# Patient Record
(demographics unavailable — no encounter records)

---

## 2025-02-20 NOTE — PROCEDURE
[FreeTextEntry1] : PFT demonstrates obstructive pattern with interval decline compared to prior-Outside study

## 2025-02-20 NOTE — HISTORY OF PRESENT ILLNESS
[Current] : current [Never] : never [TextBox_4] : 80 yo female with history of COPD with chronic bronchitis and emphysema presents for initial appointment. 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

## 2025-02-20 NOTE — ASSESSMENT
[FreeTextEntry1] : Pt currently takes 200 mg of Trelegy but is concerned about the cost, we will see what medication would be more affordable. Pt was advised that Gabapentin Should not significantly impair breathing, and that she should take the appropriate dosage for her vulvodynia If she continues to have pain I strongly recommend smoking cessation. Prescribed Anoro, advised pt to communicate with the nurses if the medication is cost prohibitive. Spirometry performed in office today shows X-ray findings determine further evaluation, I strongly recommend a lung CT scan. For now patient is refusing will consider on follow-up

## 2025-02-20 NOTE — ADDENDUM
[FreeTextEntry1] : Recorded by Estephania Coy acting as a scribe for Dr. Curtis Echavarria M.D, on 02/20/2025  All medical record entries made by the Scribe were at my, Dr. Curtis Echavarria M.D., direction and personally dictated by me on 02/20/2025. I have reviewed the chart and agree that the record accurately reflects my personal performance of the history, physical exam, assessment and plan. I have also personally directed, reviewed, and agreed with the chart.

## 2025-02-20 NOTE — PHYSICAL EXAM
[No Acute Distress] : no acute distress [Normal Oropharynx] : normal oropharynx [Normal Appearance] : normal appearance [No Neck Mass] : no neck mass [Normal Rate/Rhythm] : normal rate/rhythm [Normal S1, S2] : normal s1, s2 [No Murmurs] : no murmurs [No Abnormalities] : no abnormalities [Benign] : benign [Normal Gait] : normal gait [No Clubbing] : no clubbing [No Cyanosis] : no cyanosis [No Edema] : no edema [FROM] : FROM [Normal Color/ Pigmentation] : normal color/ pigmentation [No Focal Deficits] : no focal deficits [Oriented x3] : oriented x3 [Normal Affect] : normal affect [TextBox_68] : Reduced air entry bilateral wheezing

## 2025-03-25 NOTE — ASSESSMENT
[FreeTextEntry1] : Ms. Richards is a 73 yo F current everyday smoker presents who presents to establish care for her COPD.   She smokes 1.5-2ppd and has since the age of 14. She is not interested in smoking cessation. Last CT chest performed was at [facility] 6/17/20 mild moderate emphysematous changes.  Had CXR performed 2/20/25 showing vague RYAN opacity for which patient refused a follow-up Chest CT scan.  Patient still quite clear that does not want a CT scan.  Prefers to have a repeat CXR performed at her next follow-up appointment.  PFTs (2/2025): FEV1/FVC = 63, FEV1 (1.19) 63%, FVC (1.89) 77%, TLC 91%, %, DLCO 57% - moderate obstruction, normal lung volumes, moderately decreased diffusion capacity  Recommend: - continue Trelegy as patient has seemingly been well controlled on this regimen (has almost met deductible) - advised use of Albuterol inh prn, discussed proper administration - discussed tobacco cessation, has no interest in quitting - discussed Chest CT for lung cancer screening, not interested - would like to have a repeat CXR at time of next visit - discussed pneumococcal vaccination, not interested  RTC in 3 months for follow-up with CXR completed.

## 2025-03-25 NOTE — REASON FOR VISIT
[Initial] : an initial visit [COPD] : COPD [Emphysema] : emphysema [Nicotine Dependence] : nicotine dependence

## 2025-03-25 NOTE — HISTORY OF PRESENT ILLNESS
[Current] : current [TextBox_4] : Ms. Richards is a 75 yo F current everyday smoker presents who presents to establish care for her COPD.   She smokea 1.5-2ppd and has since the age of 14. She is not interested in smoking cessation. Last CT chest performed was at [facility] 6/17/20 mild moderate emphysematous changes. She has otherwise refused to undergo annual LDCT Chest for lung cancer screening. She is currently on Trelegy, has been for several years.  However she is concerned as she retired 4 mos ago and insurance coverages have changed and she is worried about high annual deductible.  Received samples for Breztri from another physician and still has at home, but did not like the twice daily administation and also felt it gave her a headache. Patient reports she does experience productive cough at times, but no worse no better than usual. She denies chest pain, hemoptysis, or systemic complaints.    She lives with her twin sister who is also a smoker. Retired from working at [employer] for >20yrs. [TextBox_11] : 2

## 2025-03-25 NOTE — PHYSICAL EXAM
[No Acute Distress] : no acute distress [Well Nourished] : well nourished [Well Groomed] : well groomed [Well Developed] : well developed [Normal Oropharynx] : normal oropharynx [Normal Appearance] : normal appearance [Supple] : supple [No Neck Mass] : no neck mass [Normal Rate/Rhythm] : normal rate/rhythm [Normal S1, S2] : normal s1, s2 [No Murmurs] : no murmurs [No Resp Distress] : no resp distress [No Acc Muscle Use] : no acc muscle use [Normal Rhythm and Effort] : normal rhythm and effort [Clear to Auscultation Bilaterally] : clear to auscultation bilaterally [No Abnormalities] : no abnormalities [Benign] : benign [Normal Gait] : normal gait [No Clubbing] : no clubbing [No Edema] : no edema [Normal Color/ Pigmentation] : normal color/ pigmentation [No Focal Deficits] : no focal deficits [Oriented x3] : oriented x3 [Normal Affect] : normal affect

## 2025-03-25 NOTE — REVIEW OF SYSTEMS
[Cough] : cough [Sputum] : sputum [SOB on Exertion] : sob on exertion [Fever] : no fever [Fatigue] : no fatigue [Recent Wt Gain (___ Lbs)] : ~T no recent weight gain [Chills] : no chills [Poor Appetite] : no poor appetite [Recent Wt Loss (___ Lbs)] : ~T no recent weight loss [Sore Throat] : no sore throat [Nasal Congestion] : no nasal congestion [Postnasal Drip] : no postnasal drip [Sinus Problems] : no sinus problems [Dyspnea] : no dyspnea [Pleuritic Pain] : no pleuritic pain [Wheezing] : no wheezing [Claudication] : no claudication [Palpitations] : no palpitations [Syncope] : no syncope

## 2025-06-30 NOTE — ASSESSMENT
[FreeTextEntry1] : Ms. Richards is a 78 yo F current everyday smoker presents who presents to establish care for her COPD. She smokes 1.5-2ppd and has since the age of 14. She is not interested in smoking cessation. Last CT chest performed was at [facility] 6/17/20 mild moderate emphysematous changes. Had CXR performed 2/20/25 showing vague RYAN opacity for which patient refused a follow-up Chest CT scan. Patient still quite clear that does not want a CT scan. Prefers to have a repeat CXR performed at her next follow-up appointment.  PFTs (2/2025): FEV1/FVC = 63, FEV1 (1.19) 63%, FVC (1.89) 77%, TLC 91%, %, DLCO 57% - moderate obstruction, normal lung volumes, moderately decreased diffusion capacity  Reporting increased cough, mild dyspnea, diminished BS on exam RLL but no wheezing - findings concerning for evolving Pneumonia,   Recommend: - Azithromycin x 5 days  - will hold on Prednisone at this time as no active wheezing, no desaturation, no overt dyspnea - continue Trelegy (attained through patient assistance program) - advised use of Albuterol inh prn, discussed proper administration - discussed tobacco cessation, has no interest in quitting - discussed Chest CT for lung cancer screening, not interested - would like to have a repeat CXR at time of next visit - discussed pneumococcal vaccination, not interested  RTC in 2-3 months for follow-up with CXR completed. Call office if symptoms not improving.  Marli Emmanuel MD, MSCR

## 2025-06-30 NOTE — HISTORY OF PRESENT ILLNESS
[Current] : current [TextBox_4] : Current HPI: Presents for follow-up however c/o cough present for 1-2 weeks, productive, mild dyspnea on exertion.  No fevers or chills.  Back on Trelegy through patient assistance program will help of our office RN. Taking Robitussin for cough which helps provide symptom relief however cough not resolving.  (3/25/25): Ms. Richards is a 80 yo F current everyday smoker presents who presents to establish care for her COPD. She smokea 1.5-2ppd and has since the age of 14. She is not interested in smoking cessation. Last CT chest performed was at [facility] 6/17/20 mild moderate emphysematous changes. She has otherwise refused to undergo annual LDCT Chest for lung cancer screening. She is currently on Trelegy, has been for several years. However she is concerned as she retired 4 mos ago and insurance coverages have changed and she is worried about high annual deductible. Received samples for Breztri from another physician and still has at home, but did not like the twice daily administation and also felt it gave her a headache. Patient reports she does experience productive cough at times, but no worse no better than usual. She denies chest pain, hemoptysis, or systemic complaints.  She lives with her twin sister who is also a smoker. Retired from working at [employer] for >20yrs.   Smoking Status: current   # Packs per day: 2

## 2025-06-30 NOTE — REVIEW OF SYSTEMS
[Cough] : cough [Sputum] : sputum [SOB on Exertion] : sob on exertion [Fever] : no fever [Fatigue] : no fatigue [Chills] : no chills [Poor Appetite] : no poor appetite [Epistaxis] : no epistaxis [Sore Throat] : no sore throat [Nasal Congestion] : no nasal congestion [Postnasal Drip] : no postnasal drip [Sinus Problems] : no sinus problems [Chest Tightness] : no chest tightness [Dyspnea] : no dyspnea [Pleuritic Pain] : no pleuritic pain [Wheezing] : no wheezing [Chest Discomfort] : no chest discomfort [Palpitations] : no palpitations [Syncope] : no syncope [Seasonal Allergies] : no seasonal allergies [GERD] : no gerd [Abdominal Pain] : no abdominal pain [Nausea] : no nausea [Vomiting] : no vomiting [Dysphagia] : no dysphagia [Dysuria] : no dysuria [Arthralgias] : no arthralgias [Myalgias] : no myalgias [Rash] : no rash [Easy Bruising] : no easy bruising [Clotting Disorder/ Frequent bleeding] : no clotting disorder/ frequent bleeding [Headache] : no headache [Focal Weakness] : no focal weakness [Dizziness] : no dizziness [Numbness] : no numbness [Depression] : no depression [Anxiety] : no anxiety [Obesity] : no obesity

## 2025-06-30 NOTE — PHYSICAL EXAM
[No Acute Distress] : no acute distress [Well Nourished] : well nourished [Well Groomed] : well groomed [Well Developed] : well developed [Normal Oropharynx] : normal oropharynx [Normal Appearance] : normal appearance [Supple] : supple [No Neck Mass] : no neck mass [Normal Rate/Rhythm] : normal rate/rhythm [Normal S1, S2] : normal s1, s2 [No Murmurs] : no murmurs [No Resp Distress] : no resp distress [No Acc Muscle Use] : no acc muscle use [Normal Rhythm and Effort] : normal rhythm and effort [Rales] : rales [No Abnormalities] : no abnormalities [Benign] : benign [Normal Gait] : normal gait [No Clubbing] : no clubbing [No Cyanosis] : no cyanosis [No Edema] : no edema [Normal Color/ Pigmentation] : normal color/ pigmentation [No Focal Deficits] : no focal deficits [Oriented x3] : oriented x3 [Normal Affect] : normal affect